# Patient Record
Sex: MALE | URBAN - METROPOLITAN AREA
[De-identification: names, ages, dates, MRNs, and addresses within clinical notes are randomized per-mention and may not be internally consistent; named-entity substitution may affect disease eponyms.]

---

## 2023-08-23 ENCOUNTER — APPOINTMENT (OUTPATIENT)
Dept: URBAN - METROPOLITAN AREA CLINIC 301 | Age: 78
Setting detail: DERMATOLOGY
End: 2023-08-23

## 2023-08-23 VITALS — WEIGHT: 195 LBS | HEIGHT: 71 IN

## 2023-08-23 DIAGNOSIS — Q80.0 ICHTHYOSIS VULGARIS: ICD-10-CM

## 2023-08-23 PROCEDURE — 99203 OFFICE O/P NEW LOW 30 MIN: CPT

## 2023-08-23 PROCEDURE — OTHER MIPS QUALITY: OTHER

## 2023-08-23 PROCEDURE — OTHER PRESCRIPTION: OTHER

## 2023-08-23 RX ORDER — AMMONIUM LACTATE 12 G/100G
LOTION TOPICAL
Qty: 400 | Refills: 3 | Status: ERX | COMMUNITY
Start: 2023-08-23

## 2023-08-23 ASSESSMENT — BSA RASH: BSA RASH: 90

## 2023-08-23 NOTE — PROCEDURE: MIPS QUALITY
Quality 110: Preventive Care And Screening: Influenza Immunization: Influenza Immunization not Administered because Patient Refused.
Quality 226: Preventive Care And Screening: Tobacco Use: Screening And Cessation Intervention: Patient screened for tobacco use and is an ex/non-smoker
Detail Level: Detailed
Quality 431: Preventive Care And Screening: Unhealthy Alcohol Use - Screening: Patient not identified as an unhealthy alcohol user when screened for unhealthy alcohol use using a systematic screening method
Quality 111:Pneumonia Vaccination Status For Older Adults: Patient did not receive any pneumococcal conjugate or polysaccharide vaccine on or after their 60th birthday and before the end of the measurement period

## 2024-03-06 ENCOUNTER — RX ONLY (RX ONLY)
Age: 79
End: 2024-03-06

## 2024-03-06 RX ORDER — AMMONIUM LACTATE 12 G/100G
LOTION TOPICAL
Qty: 400 | Refills: 3 | Status: ERX